# Patient Record
Sex: FEMALE | Race: OTHER | Employment: FULL TIME | URBAN - METROPOLITAN AREA
[De-identification: names, ages, dates, MRNs, and addresses within clinical notes are randomized per-mention and may not be internally consistent; named-entity substitution may affect disease eponyms.]

---

## 2020-02-14 ENCOUNTER — HOSPITAL ENCOUNTER (OUTPATIENT)
Dept: MAMMOGRAPHY | Age: 50
Discharge: HOME | End: 2020-02-14

## 2020-02-14 ENCOUNTER — OFFICE VISIT (OUTPATIENT)
Dept: FAMILY PLANNING/WOMEN'S HEALTH CLINIC | Age: 50
End: 2020-02-14

## 2020-02-14 VITALS — DIASTOLIC BLOOD PRESSURE: 84 MMHG | SYSTOLIC BLOOD PRESSURE: 120 MMHG

## 2020-02-14 DIAGNOSIS — Z01.419 ENCOUNTER FOR WELL WOMAN EXAM: Primary | ICD-10-CM

## 2020-02-14 DIAGNOSIS — Z12.31 VISIT FOR SCREENING MAMMOGRAM: ICD-10-CM

## 2020-02-14 PROCEDURE — 77067 SCR MAMMO BI INCL CAD: CPT

## 2020-02-14 NOTE — PROGRESS NOTES
Assessment/Plan:    Diagnoses and all orders for this visit:    1. Encounter for well woman exam            EDWARDO Marie expressed understanding of this plan. An AVS was printed and given to the patient.      ----------------------------------------------------------------------    Chief Complaint   Patient presents with    Well Woman     EWL visit       History of Present Illness:    Menopause 5 years ago at age 40  Has had left breast bx about 4 years ago and it was benign. She continues to have painful breasts, including today, but pain does not correspond to abnormal findings on breast exam  Last mammo was when she had her bx 4 years ago    Had her pap recently at another clinic    No past medical history on file. Current Outpatient Medications   Medication Sig Dispense Refill    metformin HCl (METFORMIN PO) Take  by mouth. Allergies   Allergen Reactions    Penicillin G Swelling and Other (comments)     Redness       Social History     Tobacco Use    Smoking status: Never Smoker    Smokeless tobacco: Never Used   Substance Use Topics    Alcohol use: Not on file    Drug use: Not on file       No family history on file. Physical Exam:     Visit Vitals  /84       A&Ox3  WDWN NAD  Respirations normal and non labored  Breast exam- toña neg for mass, dimpling, retractions, skin color changes.  She has mild tenderness everywhere I touched but w/out any abnl findings

## 2020-02-14 NOTE — PROGRESS NOTES
EVERY WOMANS LIFE HISTORY QUESTIONNAIRE       No Yes Comments   Has a doctor ever seen or felt anything wrong with your breast? []                                  [x]                                  2016 in [place], lump Left breast   Have you ever had a breast biopsy? []                                  [x]                                  For above reason, with benign results        When and where was last mammogram performed? 2016 in [place]    Have you ever been told that there was a problem on your mammogram?   No Yes Comments   []                                  [x]                                  See aboe     Do you have breast implants? No Yes Comments   [x]                                  []                                       When was your last Pap test performed? 1/30/2020 at [facility]    Have you ever had an abnormal Pap test?   No Yes Comments   [x]                                  []                                       Have you had a hysterectomy? No Yes Comments (why)   [x]                                  []                                       Have you been through menopause? No Yes Date of LMP   []                                  [x]                                  8/2015     Did your mother take LAILA? No Yes Unknown   []                                  []                                  X     Do you have a history of HIV exposure? No Yes    [x]                                  []                                       Have you ever been diagnosed with any type of Cancer   No Yes Comments (type,when,where,type of treatment   [x]                                  []                                          Has a family member been diagnosed with breast or ovarian cancer?    No Yes Comments (which family members, and type   []                                  [x]                                  Sister with some type of gyn cancer (ovarian vs uterine) in her 29's     Are you taking hormone replacement therapy (HRT)     No Yes Comments   [x]                                  []                                       How many times have you been pregnant?    16       Number of live births ? 19    Are you experiencing any of the following? No Yes Comments   Nipple Discharge [x]                                  []                                     Breast Lump/Masses [x]                                  []                                     Breast Skin Changes [x]                                  []                                          No Yes Comments   Vaginal Discharge [x]                                  []                                     Abnormal/unusual vaginal bleeding [x]                                  []                                         Are you experiencing any other health problems?      Diabetes, HTN, stomach issues------------[facility]    Ht-- 5'    Wt--163 #

## 2020-05-11 ENCOUNTER — OFFICE VISIT (OUTPATIENT)
Dept: NEUROLOGY | Age: 50
End: 2020-05-11

## 2020-05-11 VITALS
RESPIRATION RATE: 18 BRPM | OXYGEN SATURATION: 98 % | WEIGHT: 159.4 LBS | HEART RATE: 63 BPM | SYSTOLIC BLOOD PRESSURE: 132 MMHG | DIASTOLIC BLOOD PRESSURE: 84 MMHG | HEIGHT: 61 IN | TEMPERATURE: 98.3 F | BODY MASS INDEX: 30.09 KG/M2

## 2020-05-11 DIAGNOSIS — H49.21 ABDUCENS (SIXTH) NERVE PALSY, RIGHT: Primary | ICD-10-CM

## 2020-05-11 RX ORDER — GUAIFENESIN 100 MG/5ML
81 LIQUID (ML) ORAL DAILY
COMMUNITY
End: 2022-02-08

## 2020-05-11 RX ORDER — LISINOPRIL 40 MG/1
40 TABLET ORAL DAILY
COMMUNITY

## 2020-05-11 NOTE — PATIENT INSTRUCTIONS
Went over with the patient and her friend the nature of her double vision problem. Basically needs to keep the blood pressure and diabetes under best control so it does not happen again. Needs to acquire a regular eye doctor to keep track of her natural eye condition because of again the blood pressure in the diabetes and out of general principal.  Is welcome back as needed and good luck. She may still be rehabilitating with the right eye as she gets used to looking to the right side it may fatigue the and put a little bit of strain on the muscles as well. Is welcome back as needed.

## 2020-05-11 NOTE — PROGRESS NOTES
Ischemic neurology Consult      Subjective:      Nadeen Vizcarra is a 53 y.o. female who comes in today with her friend. The information from primary care and [facility] indicates she presented herself on 24 January to [facility] with a new complaint of double vision and pain in the right. The short version of the story is it was felt she had a right 6th nerve palsy and the work-up entailed a head CT brain MRI MRA which were only remarkable for scattered foci of nonspecific white matter changes. Was given 3 doses of high-dose IV steroids and following the steroid exposure she had a hemoglobin A1c of around 7.5. Says in the month of January the double vision left. Says she notices when she looks to the extreme right her right eye has a pressure-like sensation and slight fatigue and I am sure that is her rehabilitation at this point. Does not have a regular eye doctor but was cautioned to obtain one out of principal.  Was offered medicine at this time for the pressure-like sensation but politely declined. Current Outpatient Medications   Medication Sig Dispense Refill    aspirin 81 mg chewable tablet Take 81 mg by mouth daily.  lisinopriL (PRINIVIL, ZESTRIL) 40 mg tablet Take 40 mg by mouth daily.         Allergies   Allergen Reactions    Pcn [Penicillins] Rash     Past Medical History:   Diagnosis Date    Diabetes ([address]     Essential hypertension     Headache       Past Surgical History:   Procedure Laterality Date    HX CHOLECYSTECTOMY      HX TUBAL LIGATION        Social History     Socioeconomic History    Marital status: UNKNOWN     Spouse name: Not on file    Number of children: Not on file    Years of education: Not on file    Highest education level: Not on file   Occupational History    Not on file   Social Needs    Financial resource strain: Not on file    Food insecurity     Worry: Not on file     Inability: Not on file    Transportation needs     Medical: Not on file     Non-medical: Not on file   Tobacco Use    Smoking status: Never Smoker    Smokeless tobacco: Never Used   Substance and Sexual Activity    Alcohol use: Not Currently    Drug use: Not on file    Sexual activity: Yes     Birth control/protection: Surgical   Lifestyle    Physical activity     Days per week: Not on file     Minutes per session: Not on file    Stress: Not on file   Relationships    Social connections     Talks on phone: Not on file     Gets together: Not on file     Attends [employer] service: Not on file     Active member of club or organization: Not on file     Attends meetings of clubs or organizations: Not on file     Relationship status: Not on file    Intimate partner violence     Fear of current or ex partner: Not on file     Emotionally abused: Not on file     Physically abused: Not on file     Forced sexual activity: Not on file   Other Topics Concern    Not on file   Social History Narrative    Not on file      No family history on file. Visit Vitals  /84   Pulse 63   Temp 98.3 °F (36.8 °C) (Oral)   Resp 18   Ht 5' 1\" (1.549 m)   Wt 72.3 kg (159 lb 6.4 oz)   SpO2 98%   BMI 30.12 kg/m²        Review of Systems:   A comprehensive review of systems was negative except for that written in the HPI. Neuro Exam:     Appearance: The patient is well developed, well nourished, provides a coherent history and is in no acute distress. Mental Status: Oriented to time, place and person. Mood and affect appropriate. Cranial Nerves:   Intact visual fields. Fundi are benign. BERRY, EOM's full, no nystagmus, no ptosis. Facial sensation is normal. Corneal reflexes are intact. Facial movement is symmetric. Hearing is normal bilaterally. Palate is midline with normal sternocleidomastoid and trapezius muscles are normal. Tongue is midline. Visual acuity near without correction 20/40 OD and 20/30 OS. APD negative.    Motor:  5/5 strength in upper and lower proximal and distal muscles. Normal bulk and tone. No fasciculations. Reflexes:   Deep tendon reflexes 2+/4 and symmetrical.   Sensory:   Normal to touch, pinprick and vibration. Gait:  Normal gait. Tremor:   No tremor noted. Cerebellar:  No cerebellar signs present. Neurovascular:  Normal heart sounds and regular rhythm, peripheral pulses intact, and no carotid bruits. 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 No doubt on the basis of her established blood pressure and diabetes. Could happen again although it does not have to. The odds are lessened if she can keep her blood pressure and diabetes under best control. Out of principal she needs to have a regular eye doctor to keep track of her eyes, with her blood pressure and diabetes. Is welcome back as needed and sounds like she is going through a slight adjustment as she gets used to gazing to the extreme right with that eye. Probably on the basis of lateral rectus eyestrain and the rehab of the abducens nerve in the process. Politely declined any intervention with medicine for discomfort  Revisit as needed. Plan:   Revisit as needed.   Signed by :  Dwight Santana MD

## 2022-01-18 ENCOUNTER — TRANSCRIBE ORDER (OUTPATIENT)
Dept: SCHEDULING | Age: 52
End: 2022-01-18

## 2022-01-18 DIAGNOSIS — Z12.31 VISIT FOR SCREENING MAMMOGRAM: Primary | ICD-10-CM

## 2022-02-01 ENCOUNTER — TELEPHONE (OUTPATIENT)
Dept: FAMILY PLANNING/WOMEN'S HEALTH CLINIC | Age: 52
End: 2022-02-01

## 2022-02-01 NOTE — TELEPHONE ENCOUNTER
Called patient to confirm upcoming EWL appointment 2x with no success. \"The caller you've dialed is unavailable at this time, please try your call again later\" . Unable to leave voicemail, will attempt later on this week again.

## 2022-02-07 ENCOUNTER — TELEPHONE (OUTPATIENT)
Dept: FAMILY PLANNING/WOMEN'S HEALTH CLINIC | Age: 52
End: 2022-02-07

## 2022-02-07 NOTE — TELEPHONE ENCOUNTER
Called patient to confirm upcoming appointment 2x with no success. \"The person you have dialed is unavailable at this time\".

## 2022-02-08 ENCOUNTER — OFFICE VISIT (OUTPATIENT)
Dept: FAMILY PLANNING/WOMEN'S HEALTH CLINIC | Age: 52
End: 2022-02-08

## 2022-02-08 ENCOUNTER — HOSPITAL ENCOUNTER (OUTPATIENT)
Dept: MAMMOGRAPHY | Age: 52
Discharge: HOME | End: 2022-02-08

## 2022-02-08 DIAGNOSIS — Z12.31 VISIT FOR SCREENING MAMMOGRAM: ICD-10-CM

## 2022-02-08 DIAGNOSIS — Z01.419 ENCOUNTER FOR WELL WOMAN EXAM: Primary | ICD-10-CM

## 2022-02-08 DIAGNOSIS — F43.21 GRIEF AT LOSS OF CHILD: ICD-10-CM

## 2022-02-08 DIAGNOSIS — Z63.4 GRIEF AT LOSS OF CHILD: ICD-10-CM

## 2022-02-08 PROCEDURE — 77067 SCR MAMMO BI INCL CAD: CPT

## 2022-02-08 RX ORDER — ASPIRIN 81 MG/1
TABLET ORAL
COMMUNITY

## 2022-02-08 RX ORDER — LISINOPRIL AND HYDROCHLOROTHIAZIDE 10; 12.5 MG/1; MG/1
2 TABLET ORAL DAILY
COMMUNITY
Start: 2022-01-22 | End: 2022-02-08

## 2022-02-08 SDOH — SOCIAL STABILITY - SOCIAL INSECURITY: DISSAPEARANCE AND DEATH OF FAMILY MEMBER: Z63.4

## 2022-02-08 NOTE — PROGRESS NOTES
Assessment/Plan:    Diagnoses and all orders for this visit:    1. Encounter for well woman exam    2. Grief at loss of child  Recommend that she speak to her PCP at [facility] about getting into a grief group. .. EDWARDO Parikh expressed understanding of this plan. An AVS was printed and given to the patient.      ----------------------------------------------------------------------    Chief Complaint   Patient presents with    Well Woman       History of Present Illness:  EWL first visit  Pap UTD at . [facility] [address] ? Done in [place] 1-2 years ago or in [place], pt not sure  , . Her daughter  3 years ago, she was 15 at that time. The pt sobbed telling me about this loss. She states that she has scott and talks to her mom about the loss. .. Her son is 32 and lives in [place] and is doing well  She has no breast concerns  We really focused on the grief due to the loss of her child after that, she accepted prayer      Past Medical History:   Diagnosis Date    Diabetes ([facility] [address]     Essential hypertension     Headache        Current Outpatient Medications   Medication Sig Dispense Refill    aspirin delayed-release (Lo-Dose Aspirin) 81 mg tablet 1 tab(s)      hydroCHLOROthiazide 12.5 mg cap 12.5 mg, lisinopriL 5 mg tab 10 mg 2 tabs      lisinopriL (PRINIVIL, ZESTRIL) 40 mg tablet Take 40 mg by mouth daily. Allergies   Allergen Reactions    Pcn [Penicillins] Rash       Social History     Tobacco Use    Smoking status: Never Smoker    Smokeless tobacco: Never Used   Substance Use Topics    Alcohol use: Not Currently    Drug use: Not on file       No family history on file. Physical Exam:     There were no vitals taken for this visit.     A&Ox3  WDWN NAD  Respirations normal and non labored  Breast exam- toña neg for mass, tenderness, skin color changes, dimpling or retractions

## 2022-02-08 NOTE — PROGRESS NOTES
EVERY WOMANS LIFE HISTORY QUESTIONNAIRE       No Yes Comments   Has a doctor ever seen or felt anything wrong with your breast? []                                  [x]                                  2016, left breast lump, biopsy, benign. Have you ever had a breast biopsy? []                                  [x]                                  Left breast, 2016, benign        When and where was last mammogram performed? 2020/2021, Pt states she had images done here at [facility] not sure of date. Have you ever been told that there was a problem on your mammogram?   No Yes Comments   [x]                                  []                                       Do you have breast implants? No Yes Comments   [x]                                  []                                       When was your last Pap test performed?[facility] 2020    Have you ever had an abnormal Pap test?   No Yes Comments   []                                  [x]                                  2004 in [place], \"Papiloma\"     Have you had a hysterectomy? No Yes Comments (why)   [x]                                  []                                       Have you been through menopause? No Yes Date of LMP   []                                  [x]                                  8/15/2015     Did your mother take LAILA? No Yes Unknown   []                                  []                                  x     Do you have a history of HIV exposure? No Yes    [x]                                  []                                       Have you ever been diagnosed with any type of Cancer   No Yes Comments (type,when,where,type of treatment   [x]                                  []                                          Has a family member been diagnosed with breast or ovarian cancer?    No Yes Comments (which family members, and type   []                                  [x]                                  Ovarian cancer, unsure of ages, paternal grandmother, paternal aunts. after talking with pt might ne cervical or vaginal     Are you taking hormone replacement therapy (HRT)     No Yes Comments   [x]                                  []                                       How many times have you been pregnant? 2       Number of live births ? 2    Are you experiencing any of the following? No Yes Comments   Nipple Discharge [x]                                  []                                     s.Breast Lump/Masses [x]                                  []                                     Breast Skin Changes [x]                                  []                                          No Yes Comments   Vaginal Discharge [x]                                  []                                     Abnormal/unusual vaginal bleeding [x]                                  []                                         Are you experiencing any other health problems? Age at first period?  13  Age at first birth?20    Ht--5'1\"    Wt--153    [facility]  number is [phone]

## 2023-05-09 ENCOUNTER — OFFICE VISIT (OUTPATIENT)
Age: 53
End: 2023-05-09

## 2023-05-09 ENCOUNTER — HOSPITAL ENCOUNTER (OUTPATIENT)
Facility: HOSPITAL | Age: 53
Discharge: HOME | End: 2023-05-12

## 2023-05-09 DIAGNOSIS — Z80.9 FAMILY HISTORY OF CANCER: ICD-10-CM

## 2023-05-09 DIAGNOSIS — Z01.419 ENCOUNTER FOR WELL WOMAN EXAM: Primary | ICD-10-CM

## 2023-05-09 DIAGNOSIS — Z12.31 VISIT FOR SCREENING MAMMOGRAM: ICD-10-CM

## 2023-05-09 PROCEDURE — 77063 BREAST TOMOSYNTHESIS BI: CPT

## 2023-05-09 RX ORDER — LISINOPRIL AND HYDROCHLOROTHIAZIDE 12.5; 1 MG/1; MG/1
2 TABLET ORAL DAILY
COMMUNITY
Start: 2023-04-24

## 2023-05-09 NOTE — PROGRESS NOTES
EVERY WOMANS LIFE HISTORY QUESTIONNAIRE       No Yes Comments   Has a doctor ever seen or felt anything wrong with your breast? [x]                                  []                                     Have you ever had a breast biopsy? []                                  [x]                                  Left side 9/2016 in [place]; benign        When and where was last mammogram performed? 2/8/2022 [facility]    Have you ever been told that there was a problem on your mammogram?   No Yes Comments   [x]                                  []                                       Do you have breast implants? No Yes Comments   [x]                                  []                                       When was your last Pap test performed? PAP for [facility]  at [address] [place] [address]; scheduled for pap on Friday with them. Declined pelvic/pap today. Have you ever had an abnormal Pap test?   No Yes Comments   []                                  [x]                                  2004 in [place]; had [address] per patient; treated for this      Have you had a hysterectomy? No Yes Comments (why)   [x]                                  []                                       Have you been through menopause? No Yes Date of LMP   []                                  [x]                                  8/15/2015     Did your mother take RAS? No Yes Unknown   []                                  []                                  x     Do you have a history of HIV exposure? No Yes    [x]                                  []                                       Have you ever been diagnosed with any type of Cancer   No Yes Comments (type,when,where,type of treatment   [x]                                  []                                          Has a family member been diagnosed with breast or ovarian cancer?    No Yes Comments (which family members, and type   []                                  [x]
Reactions    Penicillins Other (See Comments), Rash and Swelling     Redness         Social History     Tobacco Use    Smoking status: Never    Smokeless tobacco: Never   Substance Use Topics    Alcohol use: Not Currently       No family history on file.     Physical Exam:     LMP 08/15/2015 (Exact Date)     A&Ox3  WDWN NAD  Respirations normal and non labored  Breast exam- kassandra neg for mass, tenderness, skin color changes, dimpling or retractions

## 2024-05-21 ENCOUNTER — NURSE ONLY (OUTPATIENT)
Age: 54
End: 2024-05-21

## 2024-05-21 DIAGNOSIS — Z71.89 COUNSELING AND COORDINATION OF CARE: Primary | ICD-10-CM

## 2024-05-21 NOTE — PROGRESS NOTES
[]                                      Abnormal/unusual vaginal bleeding [x]                                  []                                          Are you experiencing any other health problems?   none reported        Age at first period? 17  Age at first birth?20     Ht--5'1\"     Wt--148 lbs

## 2024-05-29 NOTE — PROGRESS NOTES
Mai Vasquez is a 54 y.o. female presents for a problem visit.    No chief complaint on file.      Problems: Abnormal Pap       Patient's last menstrual period was 08/15/2015 (exact date).    Birth Control: none.    Last Pap: abnormal obtained 1 week  ago.    Procedure Note: Colposcopy    Mai Vasquez is a ,  54 y.o. female  /   Other Patient's last menstrual period was 08/15/2015 (exact date).  She presents for colposcopy for evaluation of a cervical abnormality with a pap smear abnormality consisting of normal and HR HPV. After being presented with the risks, benefits and alternatives has she signed a consent for the procedure. She states that she understands the need for the procedure and has no further questions. She was informed that she may experience discomfort.  Procedure:  She was positioned in the dorsal lithotomy position and a speculum was inserted into the vagina. Dilute acetic acid was applied to the cervix. The colposcope was used to visualize the cervix. Procedure: The transformation zone was completely visualized.  Findings:   This colposcopy was satisfactory. The procedure was notable for no white epithelium on the cervix. Biopsies were taken from the cervix at 12 and 6.  Silver nitrate was applied to the cervix for hemostasis.  Endocervical currettage: An endocervical curettage was performed.   Post Procedure Status: The patient tolerated the procedure well with minimal discomfort.   She was observed for 10 minutes and released in good condition.        1. Have you been to the ER, urgent care clinic, or hospitalized since your last visit? No    2. Have you seen or consulted any other health care providers outside of the [facility] since your last visit? No        Chart reviewed for the following:   Ronald MARTINEZ LPN, have reviewed the History, Physical and updated the Allergic reactions for Mai Vsaquez     TIME OUT performed

## 2024-05-30 ENCOUNTER — OFFICE VISIT (OUTPATIENT)
Age: 54
End: 2024-05-30

## 2024-05-30 VITALS — DIASTOLIC BLOOD PRESSURE: 88 MMHG | SYSTOLIC BLOOD PRESSURE: 122 MMHG | WEIGHT: 147.4 LBS

## 2024-05-30 DIAGNOSIS — B97.7 HIGH RISK HPV INFECTION: Primary | ICD-10-CM

## 2024-05-30 DIAGNOSIS — R87.619 ABNORMAL CERVICAL PAPANICOLAOU SMEAR, UNSPECIFIED ABNORMAL PAP FINDING: ICD-10-CM

## 2024-05-30 SDOH — ECONOMIC STABILITY: HOUSING INSECURITY
IN THE LAST 12 MONTHS, WAS THERE A TIME WHEN YOU DID NOT HAVE A STEADY PLACE TO SLEEP OR SLEPT IN A SHELTER (INCLUDING NOW)?: NO

## 2024-05-30 SDOH — ECONOMIC STABILITY: INCOME INSECURITY: HOW HARD IS IT FOR YOU TO PAY FOR THE VERY BASICS LIKE FOOD, HOUSING, MEDICAL CARE, AND HEATING?: NOT HARD AT ALL

## 2024-05-30 SDOH — ECONOMIC STABILITY: FOOD INSECURITY: WITHIN THE PAST 12 MONTHS, THE FOOD YOU BOUGHT JUST DIDN'T LAST AND YOU DIDN'T HAVE MONEY TO GET MORE.: NEVER TRUE

## 2024-05-30 SDOH — ECONOMIC STABILITY: FOOD INSECURITY: WITHIN THE PAST 12 MONTHS, YOU WORRIED THAT YOUR FOOD WOULD RUN OUT BEFORE YOU GOT MONEY TO BUY MORE.: NEVER TRUE

## 2024-05-30 ASSESSMENT — PATIENT HEALTH QUESTIONNAIRE - PHQ9
SUM OF ALL RESPONSES TO PHQ QUESTIONS 1-9: 0
1. LITTLE INTEREST OR PLEASURE IN DOING THINGS: NOT AT ALL
SUM OF ALL RESPONSES TO PHQ QUESTIONS 1-9: 0
2. FEELING DOWN, DEPRESSED OR HOPELESS: NOT AT ALL
SUM OF ALL RESPONSES TO PHQ9 QUESTIONS 1 & 2: 0

## 2024-06-04 LAB
CPT BILLING CODE: NORMAL
DIAGNOSIS SYNOPSIS:: NORMAL
DX ICD CODE: NORMAL
DX ICD CODE: NORMAL
PATH REPORT.FINAL DX SPEC: NORMAL
PATH REPORT.GROSS SPEC: NORMAL
PATH REPORT.SITE OF ORIGIN SPEC: NORMAL
PATHOLOGIST NAME: NORMAL
PAYMENT PROCEDURE: NORMAL

## 2024-06-06 ENCOUNTER — NURSE ONLY (OUTPATIENT)
Age: 54
End: 2024-06-06

## 2024-06-06 NOTE — PROGRESS NOTES
Encounter completed in re: finalizing patient's referral in to the [facility] program.   Patient referred to us for colposcopy by the [facility]. Pt enrolled in [facility] program and was sent to have colposcopy on 5/30/2024- resulted normal/benign changes.  The provider was routed the office note and pathology  report  and pt STILL needs to be told the results by Dr. Driscoll office and told when next pap is due.  Patient has been billed in [employer].  ALBA KNUTSON, RN

## 2024-09-12 ENCOUNTER — TELEPHONE (OUTPATIENT)
Age: 54
End: 2024-09-12

## 2024-12-05 RX ORDER — LISINOPRIL 10 MG/1
10 TABLET ORAL DAILY
COMMUNITY

## 2024-12-05 NOTE — FLOWSHEET NOTE
[facility]  Endoscopy Preprocedure Instructions      1. On the day of your surgery, please report to registration located on the 2nd floor of the  the Main Hospital. yes    2. You must have a responsible adult to drive you to the hospital, stay at the hospital during your procedure and drive you home. If they leave your procedure will not be started (no exceptions). yes    3. Do not have anything to eat or drink (including water, gum, mints, coffee, and juice) after midnight. This does not apply to the medications you were instructed to take by your physician.yes  If you are currently taking Plavix, Coumadin, Aspirin, or other blood-thinning agents, contact your physician for special instructions. not applicable    4. If you are having a procedure that requires bowel prep: We recommend that you have only clear liquids the day before your procedure and begin your bowel prep by 5:00 pm.  You may continue to drink clear liquids until midnight.  If for any reason you are not able to complete your prep please notify your physician immediately. yes    5. Have a list of all current medications, including vitamins, herbal supplements and any other over the counter medications. Reviewed over the phone with an     6. If you wear glasses, contacts, dentures and/or hearing aids, they may be removed prior to procedure, please bring a case to store them in. not applicable    7. You should understand that if you do not follow these instructions your procedure may be cancelled.  If your physical condition changes (I.e. fever, cold or flu) please contact your doctor as soon as possible.    8. It is important that you be on time.  If for any reason you are unable to keep your appointment please call [phone] the day of or your physician’s office prior to your scheduled procedure    9. Have you received your COVID Vaccine? yes If no, you will need to receive a COVID test/swab here at [facility] the

## 2024-12-11 ENCOUNTER — ANESTHESIA (OUTPATIENT)
Facility: HOSPITAL | Age: 54
End: 2024-12-11

## 2024-12-11 ENCOUNTER — HOSPITAL ENCOUNTER (OUTPATIENT)
Facility: HOSPITAL | Age: 54
Setting detail: OUTPATIENT SURGERY
Discharge: HOME | End: 2024-12-11
Attending: INTERNAL MEDICINE | Admitting: INTERNAL MEDICINE

## 2024-12-11 ENCOUNTER — ANESTHESIA EVENT (OUTPATIENT)
Facility: HOSPITAL | Age: 54
End: 2024-12-11

## 2024-12-11 VITALS
HEIGHT: 60 IN | HEART RATE: 60 BPM | OXYGEN SATURATION: 100 % | BODY MASS INDEX: 27.87 KG/M2 | WEIGHT: 141.98 LBS | SYSTOLIC BLOOD PRESSURE: 140 MMHG | DIASTOLIC BLOOD PRESSURE: 80 MMHG | TEMPERATURE: 97.8 F | RESPIRATION RATE: 18 BRPM

## 2024-12-11 PROCEDURE — 88305 TISSUE EXAM BY PATHOLOGIST: CPT

## 2024-12-11 PROCEDURE — 2709999900 HC NON-CHARGEABLE SUPPLY: Performed by: INTERNAL MEDICINE

## 2024-12-11 PROCEDURE — 3600007512: Performed by: INTERNAL MEDICINE

## 2024-12-11 PROCEDURE — 3600007502: Performed by: INTERNAL MEDICINE

## 2024-12-11 PROCEDURE — 3700000000 HC ANESTHESIA ATTENDED CARE: Performed by: INTERNAL MEDICINE

## 2024-12-11 PROCEDURE — 2580000003 HC RX 258: Performed by: INTERNAL MEDICINE

## 2024-12-11 PROCEDURE — 3700000001 HC ADD 15 MINUTES (ANESTHESIA): Performed by: INTERNAL MEDICINE

## 2024-12-11 PROCEDURE — 6360000002 HC RX W HCPCS: Performed by: NURSE ANESTHETIST, CERTIFIED REGISTERED

## 2024-12-11 PROCEDURE — 7100000010 HC PHASE II RECOVERY - FIRST 15 MIN: Performed by: INTERNAL MEDICINE

## 2024-12-11 PROCEDURE — 7100000011 HC PHASE II RECOVERY - ADDTL 15 MIN: Performed by: INTERNAL MEDICINE

## 2024-12-11 RX ORDER — SIMETHICONE 40MG/0.6ML
SUSPENSION, DROPS(FINAL DOSAGE FORM)(ML) ORAL
Status: DISCONTINUED
Start: 2024-12-11 | End: 2024-12-11 | Stop reason: HOSPADM

## 2024-12-11 RX ORDER — SODIUM CHLORIDE 9 MG/ML
INJECTION, SOLUTION INTRAVENOUS CONTINUOUS
Status: DISCONTINUED | OUTPATIENT
Start: 2024-12-11 | End: 2024-12-11 | Stop reason: HOSPADM

## 2024-12-11 RX ORDER — PROPOFOL 10 MG/ML
INJECTION, EMULSION INTRAVENOUS
Status: DISCONTINUED | OUTPATIENT
Start: 2024-12-11 | End: 2024-12-11 | Stop reason: SDUPTHER

## 2024-12-11 RX ADMIN — PROPOFOL 100 MG: 10 INJECTION, EMULSION INTRAVENOUS at 07:35

## 2024-12-11 RX ADMIN — SODIUM CHLORIDE: 9 INJECTION, SOLUTION INTRAVENOUS at 07:30

## 2024-12-11 RX ADMIN — PROPOFOL 150 MCG/KG/MIN: 10 INJECTION, EMULSION INTRAVENOUS at 07:39

## 2024-12-11 ASSESSMENT — PAIN - FUNCTIONAL ASSESSMENT: PAIN_FUNCTIONAL_ASSESSMENT: 0-10

## 2024-12-11 NOTE — ANESTHESIA PRE PROCEDURE
Department of Anesthesiology  Preprocedure Note       Name:  Mai Vasquez   Age:  54 y.o.  :  1970                                          MRN:  [id number]         Date:  2024      Surgeon: Surgeon(s):  Yordy Gutierrez MD    Procedure: Procedure(s):  COLONOSCOPY WITH BIOPSY    Medications prior to admission:   Prior to Admission medications    Medication Sig Start Date End Date Taking? Authorizing Provider   lisinopril (PRINIVIL;ZESTRIL) 10 MG tablet Take 1 tablet by mouth daily   Yes Bridgett Stephen MD   omeprazole (PRILOSEC) 20 MG delayed release capsule Take 1 capsule by mouth every other day   Yes Bridgett Stephen MD   Cholecalciferol (VITAMIN D3 PO) Take by mouth   Yes Bridgett Stephen MD   Cyanocobalamin (VITAMIN B 12 PO) Take by mouth Takes one po once daily.   Yes Bridgett Stephen MD   Pyridoxine HCl (VITAMIN B-6 PO) Take by mouth Takes one po once daily.   Yes Bridgett Stephen MD   VITAMIN E PO Take by mouth Takes one po once daily.   Yes Bridgett Stephen MD   VITAMIN A PO Take by mouth Takes one po once daily.   Yes Bridgett Stephen MD   Calcium-Magnesium-Vitamin D (CALCIUM MAGNESIUM PO) Take by mouth Takes one po every night.   Yes Bridgett Stephen MD   aspirin 81 MG EC tablet 1 tablet daily   Yes Automatic Reconciliation, Ar       Current medications:    No current facility-administered medications for this encounter.       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

## 2024-12-11 NOTE — DISCHARGE INSTRUCTIONS
[facility]  [address] [place] [address]  [phone]                   Mai Vasquez  [id number]  1970    COLON DISCHARGE INSTRUCTIONS    DISCOMFORT:  Redness at IV site- apply warm compress to area; if redness or soreness persist- contact your physician  There may be a slight amount of blood passed from the rectum  Gaseous discomfort- walking, belching will help relieve any discomfort  You may not operate a vehicle for 12 hours  You may not  engage in an occupation involving machinery or appliances for rest of today  You may not  drink alcoholic beverages for at least 12 hours  Avoid making any critical decisions for at least 24 hour    DIET:   High fiber diet.   - however -  remember your colon is empty and a heavy meal will produce gas.   Avoid these foods:  vegetables, fried / greasy foods, carbonated drinks for today     ACTIVITY:  It is recommended that you spend the remainder of the day resting -  avoid any strenuous activity.  CALL M.D.  ANY SIGN OF:   Increasing pain, nausea, vomiting  Abdominal distension (swelling)  New increased bleeding (oral or rectal)  Fever (chills)  Pain in chest area  Bloody discharge from nose or mouth  Shortness of breath    You may resume medications    Post procedure diagnosis:  internal hemorrhoids and colon polyp ( removed)    Follow-up Instructions:    If a specimen was collected, you will receive a letter with the result by mail within two  weeks. Depending on the result this letter will specify your follow up colonoscopy date.      Please call us for any questions or concerns                     Mai Vasquez  [id number]  1970        DISCHARGE SUMMARY from Nurse    The following personal items collected during your admission are returned to you:   Dental Appliance:    Vision:    Hearing Aid:    Jewelry:    Clothing:    Other Valuables:    Valuables sent to safe:

## 2024-12-11 NOTE — PERIOP NOTE
Received recovery report from anesthesia team, see anesthesia note. Abdomen remains soft and non-tender post-procedure. Pt has no complaints at this time and tolerated procedure well. Endoscope was pre-cleaned at the bedside by Anders Wei RN immediately following procedure. Post recovery report given to Lyla Padron RN.

## 2024-12-11 NOTE — OP NOTE
[facility]  [address] [place] [address]  [phone]                   Colonoscopy Operative Report      Indications:    Family history of coloretal adenoma  (screening only)     :  Yordy Gutierrez MD    Assistants: None    Referring Provider: Madiha Khanna PA    Sedation:  MAC anesthesia Propofol    Procedure Details:  After informed consent was obtained with all risks and benefits of procedure explained and preoperative exam completed, the patient was taken to the endoscopy suite and placed in the left lateral decubitus position.  Upon sequential sedation as per above, a digital rectal exam was performed  And was normal.  The Olympus videocolonoscope  was inserted in the rectum and carefully advanced to the cecum, which was identified by the ileocecal valve and appendiceal orifice, terminal ileum.  The quality of preparation was excellent.  The colonoscope was slowly withdrawn with careful evaluation between folds. Retroflexion in the rectum was performed and was normal..     Findings:   Rectum: Grade 1 internal hemorrhoid(s);  Sigmoid: normal  Descending Colon: normal  Transverse Colon: normal  Ascending Colon: 1  Sessile polyp(s), the largest 8 mm in size;  Cecum: normal  Terminal Ileum: normal    Interventions:  1 complete polypectomy were performed using cold snare  and the polyps were  retrieved    Specimen Removed:  specimen #1, 7 mm in size, located in the ascending colon removed by cold snare and retrieved for pathology    Implants: none    Complications: None.     EBL:  None.    Impression: Colon polyp ( removed )                        Internal hemorrhoids    Recommendations:   -Await pathology.  -Repeat colonoscopy in 5 years.  -High fiber diet.     -Resume normal medication(s)  -EGD in 1 yr ( pt has strong family hx of stomach ca)   -Call office for pathology results in 1 week  -Call for any questions/concerns       Discharge Disposition:

## 2024-12-11 NOTE — H&P
[facility]  [address] [place] [address]  [phone]                     History and Physical     NAME: Mai Vasquez   :  1970   MRN:  [id number]     HPI:   Pt is here today for screening colonoscopy. She is average risk    Past Surgical History:   Procedure Laterality Date    BREAST BIOPSY Left     2016 benign    CHOLECYSTECTOMY      ENDOSCOPY, COLON, DIAGNOSTIC      TUBAL LIGATION      not sure if she had a tubal ligation, but states she cannot have children d/t surgery     Past Medical History:   Diagnosis Date    Diabetes (HCC)     Essential hypertension     Headache     Hyperlipidemia     diet controlled in past     Social History     Tobacco Use    Smoking status: Never    Smokeless tobacco: Never   Vaping Use    Vaping status: Never Used   Substance Use Topics    Alcohol use: Never    Drug use: Never     Allergies   Allergen Reactions    Penicillins Other (See Comments), Rash and Swelling     Redness       Family History   Problem Relation Age of Onset    Hypertension Mother     Stomach Cancer Father     Alcohol Abuse Father     Thyroid Cancer Sister     Uterine Cancer Other         paternal side    Stomach Cancer Other         paternal side     Current Facility-Administered Medications   Medication Dose Route Frequency    simethicone (MYLICON) 40 MG/0.6ML suspension drops             PHYSICAL EXAM:  General: WD, WN. Alert, cooperative, no acute distress    HEENT: NC, Atraumatic.  PERRLA, EOMI. Anicteric sclerae.  Lungs:  CTA Bilaterally. No Wheezing/Rhonchi/Rales.  Heart:  Regular  rhythm,  No murmur, No Rubs, No Gallops  Abdomen: Soft, Non distended, Non tender.  +Bowel sounds, no HSM  Extremities: No c/c/e  Neurologic:  CN 2-12 gi, Alert and oriented X 3.  No acute neurological distress   Psych:   Good insight. Not anxious nor agitated.           Assessment:   I have reviewed with the patient +/- family alternatives,benefits and risks for

## 2024-12-11 NOTE — ANESTHESIA POSTPROCEDURE EVALUATION
Department of Anesthesiology  Postprocedure Note    Patient: Mai Vasquez  MRN: [id number]  YOB: 1970  Date of evaluation: 12/11/2024    Procedure Summary       Date: 12/11/24 Room / Location: [facility] 03 / [facility] ENDOSCOPY    Anesthesia Start: 0730 Anesthesia Stop: 0755    Procedures:       COLONOSCOPY WITH BIOPSY (Lower GI Region)      COLONOSCOPY POLYPECTOMY SNARE/BIOPSY (Lower GI Region) Diagnosis:       Colon cancer screening      (Colon cancer screening [Z12.11])    Surgeons: Yordy Gutierrez MD Responsible Provider: Harshil Montero MD    Anesthesia Type: MAC ASA Status: 3            Anesthesia Type: No value filed.    Mateo Phase I: Mateo Score: 10    Mateo Phase II: Mateo Score: 10    Anesthesia Post Evaluation    Patient location during evaluation: PACU  Patient participation: complete - patient participated  Level of consciousness: awake and alert  Pain score: 0  Airway patency: patent  Nausea & Vomiting: no nausea and no vomiting  Cardiovascular status: blood pressure returned to baseline  Respiratory status: acceptable  Hydration status: euvolemic  Pain management: satisfactory to patient    No notable events documented.

## 2025-02-06 ENCOUNTER — TELEPHONE (OUTPATIENT)
Age: 55
End: 2025-02-06

## 2025-02-06 NOTE — TELEPHONE ENCOUNTER
Called patient to remind of upcoming appointment with [facility] on 2/11/2025- no answer. Left message with appt. Details and [facility] main office number in case that she wants to cancel/reschedule.   Sheba Larose

## 2025-02-10 ENCOUNTER — TELEPHONE (OUTPATIENT)
Age: 55
End: 2025-02-10

## 2025-02-10 NOTE — TELEPHONE ENCOUNTER
Called patient to cancel appointment with us on 2/11/2025. Due to inclement weather. Not able to leave mehdi Larose

## 2025-06-05 ENCOUNTER — TELEPHONE (OUTPATIENT)
Age: 55
End: 2025-06-05

## 2025-06-05 NOTE — TELEPHONE ENCOUNTER
The  services used.Pt called x 2 but no answer. Letter will be sent to pt explaining that at this time we must cancel their [facility] mammogram appt due to potential funding changes because of changes to the program's budget.  SHELLY ROBERTSON RN

## 2025-07-02 ENCOUNTER — TELEPHONE (OUTPATIENT)
Age: 55
End: 2025-07-02

## 2025-07-02 NOTE — TELEPHONE ENCOUNTER
I called patient and rescheduled her pap and mammogram appointment at first available opening for 10/14/2025 and patient knows that we will be sending her appointment reminder letter closer to this date. ALBA KNUTSON RN

## 2025-08-01 ENCOUNTER — TRANSCRIBE ORDERS (OUTPATIENT)
Facility: HOSPITAL | Age: 55
End: 2025-08-01

## 2025-08-01 DIAGNOSIS — R22.1 NECK MASS: ICD-10-CM

## 2025-08-01 DIAGNOSIS — R22.9 LOCALIZED MASS: Primary | ICD-10-CM

## 2025-08-08 ENCOUNTER — TRANSCRIBE ORDERS (OUTPATIENT)
Facility: HOSPITAL | Age: 55
End: 2025-08-08

## 2025-08-08 DIAGNOSIS — B17.9 ACUTE VIRAL HEPATITIS, UNSPECIFIED VIRAL HEPATITIS TYPE: Primary | ICD-10-CM

## 2025-08-27 ENCOUNTER — HOSPITAL ENCOUNTER (OUTPATIENT)
Facility: HOSPITAL | Age: 55
Discharge: HOME | End: 2025-08-30

## 2025-08-27 DIAGNOSIS — R22.1 NECK MASS: ICD-10-CM

## 2025-08-27 DIAGNOSIS — R22.9 LOCALIZED MASS: ICD-10-CM

## 2025-08-27 DIAGNOSIS — B17.9 ACUTE VIRAL HEPATITIS, UNSPECIFIED VIRAL HEPATITIS TYPE: ICD-10-CM

## 2025-08-27 PROCEDURE — 76882 US LMTD JT/FCL EVL NVASC XTR: CPT

## 2025-08-27 PROCEDURE — 70490 CT SOFT TISSUE NECK W/O DYE: CPT

## 2025-08-27 PROCEDURE — 74183 MRI ABD W/O CNTR FLWD CNTR: CPT

## 2025-08-27 PROCEDURE — A9579 GAD-BASE MR CONTRAST NOS,1ML: HCPCS | Performed by: STUDENT IN AN ORGANIZED HEALTH CARE EDUCATION/TRAINING PROGRAM

## 2025-08-27 PROCEDURE — 2580000003 HC RX 258: Performed by: STUDENT IN AN ORGANIZED HEALTH CARE EDUCATION/TRAINING PROGRAM

## 2025-08-27 PROCEDURE — 76881 US COMPL JOINT R-T W/IMG: CPT

## 2025-08-27 PROCEDURE — 6360000004 HC RX CONTRAST MEDICATION: Performed by: STUDENT IN AN ORGANIZED HEALTH CARE EDUCATION/TRAINING PROGRAM

## 2025-08-27 RX ORDER — GADOTERIDOL 279.3 MG/ML
13 INJECTION INTRAVENOUS
Status: COMPLETED | OUTPATIENT
Start: 2025-08-27 | End: 2025-08-27

## 2025-08-27 RX ORDER — IOPAMIDOL 755 MG/ML
100 INJECTION, SOLUTION INTRAVASCULAR
Status: DISCONTINUED | OUTPATIENT
Start: 2025-08-27 | End: 2025-08-27

## 2025-08-27 RX ORDER — 0.9 % SODIUM CHLORIDE 0.9 %
100 INTRAVENOUS SOLUTION INTRAVENOUS ONCE
Status: COMPLETED | OUTPATIENT
Start: 2025-08-27 | End: 2025-08-27

## 2025-08-27 RX ADMIN — GADOTERIDOL 13 ML: 279.3 INJECTION, SOLUTION INTRAVENOUS at 17:12

## 2025-08-27 RX ADMIN — SODIUM CHLORIDE 100 ML: 9 INJECTION, SOLUTION INTRAVENOUS at 17:12

## (undated) DEVICE — SET GRAV CK VLV NEEDLESS ST 3 GANGED 4WAY STPCOCK HI FLO 10

## (undated) DEVICE — 3M™ CUROS™ DISINFECTING CAP FOR NEEDLELESS CONNECTORS 270/CARTON 20 CARTONS/CASE CFF1-270: Brand: CUROS™

## (undated) DEVICE — CONTAINER SPEC 20 ML LID NEUT BUFF FORMALIN 10 % POLYPR STS